# Patient Record
Sex: FEMALE | URBAN - METROPOLITAN AREA
[De-identification: names, ages, dates, MRNs, and addresses within clinical notes are randomized per-mention and may not be internally consistent; named-entity substitution may affect disease eponyms.]

---

## 2024-07-25 ENCOUNTER — APPOINTMENT (RX ONLY)
Dept: URBAN - METROPOLITAN AREA CLINIC 55 | Facility: CLINIC | Age: 44
Setting detail: DERMATOLOGY
End: 2024-07-25

## 2024-07-25 DIAGNOSIS — Z41.9 ENCOUNTER FOR PROCEDURE FOR PURPOSES OTHER THAN REMEDYING HEALTH STATE, UNSPECIFIED: ICD-10-CM

## 2024-07-25 PROCEDURE — ? INVENTORY

## 2024-07-25 PROCEDURE — ? COSMETIC CONSULTATION: GENERAL

## 2024-07-25 PROCEDURE — ? BOTOX

## 2024-07-25 NOTE — PROCEDURE: BOTOX
Additional Area 6 Units: 0
Forehead Units: 8
Detail Level: Detailed
Show Orbicularis Oculi Units: Yes
Show Inventory Tab: Show
Show Right And Left Pupillary Line Units: No
Glabellar Complex Units: 16
Depressor Anguli Oris Units: 6
Additional Area 2 Location: chin
Consent: Verbal consent obtained. Risks include but not limited to lid/brow ptosis, bruising, swelling, diplopia, temporary effect, incomplete chemical denervation.
Periorbital Skin Units: 12
Additional Area 1 Location: upper lip
Dilution (U/0.1 Cc): 4
Post-Care Instructions: Patient instructed to not lie down for 4 hours and limit physical activity for 24 hours.
Incrementing Botox Units: By 0.5 Units
Additional Area 3 Location: lower lip

## 2024-10-15 ENCOUNTER — APPOINTMENT (RX ONLY)
Dept: URBAN - METROPOLITAN AREA CLINIC 55 | Facility: CLINIC | Age: 44
Setting detail: DERMATOLOGY
End: 2024-10-15

## 2024-10-15 DIAGNOSIS — Z41.9 ENCOUNTER FOR PROCEDURE FOR PURPOSES OTHER THAN REMEDYING HEALTH STATE, UNSPECIFIED: ICD-10-CM

## 2024-10-15 PROCEDURE — ? FILLERS

## 2024-10-15 PROCEDURE — ? INVENTORY

## 2024-10-15 PROCEDURE — ? COSMETIC CONSULTATION: GENERAL

## 2024-10-15 NOTE — PROCEDURE: FILLERS
Nasolabial Folds Filler Volume In Cc: 0
Anesthesia Type: 1% lidocaine with epinephrine
Anesthesia Volume In Cc: 0.5
Additional Anesthesia Volume In Cc: 6
Include Cannula Information In Note?: No
Detail Level: Detailed
Include Cannula Information In Note?: Yes
Consent: Verbal consent obtained, risks reviewed.
Post-Care Instructions: After the procedure, patient instructed to apply ice to reduce swelling.
Include Cannula Size?: 25G
Include Cannula Size?: 27G
Filler: RHA 3
Map Statment: See Attach Map for Complete Details

## 2024-10-30 ENCOUNTER — APPOINTMENT (RX ONLY)
Dept: URBAN - METROPOLITAN AREA CLINIC 55 | Facility: CLINIC | Age: 44
Setting detail: DERMATOLOGY
End: 2024-10-30

## 2024-10-30 DIAGNOSIS — Z41.9 ENCOUNTER FOR PROCEDURE FOR PURPOSES OTHER THAN REMEDYING HEALTH STATE, UNSPECIFIED: ICD-10-CM

## 2024-10-30 PROCEDURE — ? FILLERS

## 2024-10-30 PROCEDURE — ? INVENTORY

## 2024-10-30 NOTE — PROCEDURE: FILLERS
Dorsal Hands Filler Volume In Cc: 0
Post-Care Instructions: After the procedure, patient instructed to apply ice to reduce swelling.
Include Cannula Information In Note?: No
Filler: RHA Redensity
Map Statment: See Attach Map for Complete Details
Include Cannula Size?: 27G
Anesthesia Type: 1% lidocaine with epinephrine
Include Cannula Information In Note?: Yes
Anesthesia Volume In Cc: 0.5
Additional Anesthesia Volume In Cc: 6
Include Cannula Size?: 25G
Detail Level: Detailed
Consent: Verbal consent obtained, risks reviewed.

## 2025-01-15 ENCOUNTER — APPOINTMENT (OUTPATIENT)
Dept: URBAN - METROPOLITAN AREA CLINIC 55 | Facility: CLINIC | Age: 45
Setting detail: DERMATOLOGY
End: 2025-01-15

## 2025-01-15 DIAGNOSIS — Z41.9 ENCOUNTER FOR PROCEDURE FOR PURPOSES OTHER THAN REMEDYING HEALTH STATE, UNSPECIFIED: ICD-10-CM

## 2025-01-15 PROCEDURE — ? COSMETIC CONSULTATION: CLEAR AND BRILLIANT

## 2025-01-15 PROCEDURE — ? INVENTORY

## 2025-01-15 PROCEDURE — ? COSMETIC CONSULTATION: GENERAL

## 2025-01-15 PROCEDURE — ? BOTOX

## 2025-01-15 NOTE — PROCEDURE: BOTOX
Inferior Lateral Orbicularis Oculi Units: 0
Show Additional Area 4: Yes
Detail Level: Detailed
Show Right And Left Pupillary Line Units: No
Incrementing Botox Units: By 0.5 Units
Periorbital Skin Units: 12
Additional Area 1 Location: jawline
Show Inventory Tab: Show
Forehead Units: 8
Consent obtained and risk reviewed.
Additional Area 1 Units: 20
Dilution (U/0.1 Cc): 4
Post-Care Instructions: Discussed not to lie down flat  or vigorously rub the treatment area for the next 4 hours .
Glabellar Complex Units: 16
Comments: Make up was removed from treatment area and then prepped with 70% isopropyl alcohol prior to injections.
Depressor Anguli Oris Units: 6

## 2025-04-30 ENCOUNTER — APPOINTMENT (OUTPATIENT)
Dept: URBAN - METROPOLITAN AREA CLINIC 55 | Facility: CLINIC | Age: 45
Setting detail: DERMATOLOGY
End: 2025-04-30

## 2025-04-30 DIAGNOSIS — Z41.9 ENCOUNTER FOR PROCEDURE FOR PURPOSES OTHER THAN REMEDYING HEALTH STATE, UNSPECIFIED: ICD-10-CM

## 2025-04-30 PROCEDURE — ? BOTOX

## 2025-04-30 PROCEDURE — ? INVENTORY

## 2025-04-30 NOTE — PROCEDURE: BOTOX
R Brow Units: 0
Show Periorbital Units: Yes
Detail Level: Detailed
Depressor Anguli Oris Units: 6
Additional Area 3 Location: gummy smile
Show Right And Left Periorbital Units: No
Dilution (U/0.1 Cc): 4
Periorbital Skin Units: 12
Additional Area 2 Location: Nasalis
Incrementing Botox Units: By 0.5 Units
Consent obtained. Risks include but not limited to lid/brow ptosis, bruising, swelling, diplopia, temporary effect, incomplete chemical denervation.
Additional Area 1 Location: Lip
Show Inventory Tab: Hide
Forehead Units: 8
Additional Area 4 Location: Chin
Post-Care Instructions: Patient instructed to not lie down for 4 hours and limit physical activity for 24 hours.
Glabellar Complex Units: 16

## 2025-07-25 ENCOUNTER — APPOINTMENT (OUTPATIENT)
Dept: URBAN - METROPOLITAN AREA CLINIC 55 | Facility: CLINIC | Age: 45
Setting detail: DERMATOLOGY
End: 2025-07-25

## 2025-07-25 ENCOUNTER — RX ONLY (RX ONLY)
Age: 45
End: 2025-07-25

## 2025-07-25 DIAGNOSIS — Z41.9 ENCOUNTER FOR PROCEDURE FOR PURPOSES OTHER THAN REMEDYING HEALTH STATE, UNSPECIFIED: ICD-10-CM

## 2025-07-25 PROCEDURE — ? COSMETIC CONSULTATION: GENERAL

## 2025-07-25 PROCEDURE — ? SCULPTRA

## 2025-07-25 RX ORDER — IVERMECTIN/METRONIDAZOLE/NIACI 1 %-1 %-4%
GEL (GRAM) TOPICAL
Qty: 30 | Refills: 0 | Status: ERX | COMMUNITY
Start: 2025-07-25

## 2025-07-25 NOTE — PROCEDURE: SCULPTRA
Show Decollete Volume?: Yes
Left Cheek Filler Volume In Cc: 0
Show Right And Left [facility]mc Volume?: No
Dilution Method: Sculptra was reconstituted with 8mL of sterile water and 2cc of 2% plain lidocaine for a total volume of 10ccs for each vial.
Injection Technique: The Sculptra was injected with a 25g cannula to the listed areas after removing makeup, and cleansed with 70% isopropyl alcohol.
Anesthesia Volume In Cc: 0.2
Consent obtained and risk reviewed.
Detail Level: Detailed
Additional Anesthesia Volume In Cc: 6
Show Inventory Tab: Show
Post-Care Instructions: Patient instructed to apply ice to reduce swelling and reviewed post procedural massage.
Volumizer: Sculptra
Map Statement: See Attached Map for Complete Details.
Vials Reconstituted (Required For Inventory): 2
Anesthesia Type: 1% lidocaine with epinephrine

## 2025-08-20 ENCOUNTER — APPOINTMENT (OUTPATIENT)
Dept: URBAN - METROPOLITAN AREA CLINIC 55 | Facility: CLINIC | Age: 45
Setting detail: DERMATOLOGY
End: 2025-08-20

## 2025-08-20 DIAGNOSIS — Z41.9 ENCOUNTER FOR PROCEDURE FOR PURPOSES OTHER THAN REMEDYING HEALTH STATE, UNSPECIFIED: ICD-10-CM

## 2025-08-20 PROCEDURE — ? FILLERS

## 2025-08-20 PROCEDURE — ? INVENTORY

## 2025-08-26 ENCOUNTER — APPOINTMENT (OUTPATIENT)
Dept: URBAN - METROPOLITAN AREA CLINIC 55 | Facility: CLINIC | Age: 45
Setting detail: DERMATOLOGY
End: 2025-08-26

## 2025-08-26 DIAGNOSIS — Z41.9 ENCOUNTER FOR PROCEDURE FOR PURPOSES OTHER THAN REMEDYING HEALTH STATE, UNSPECIFIED: ICD-10-CM

## 2025-08-26 PROCEDURE — ? BOTOX

## 2025-08-26 PROCEDURE — ? COSMETIC FOLLOW-UP

## 2025-09-02 ENCOUNTER — APPOINTMENT (OUTPATIENT)
Dept: URBAN - METROPOLITAN AREA CLINIC 55 | Facility: CLINIC | Age: 45
Setting detail: DERMATOLOGY
End: 2025-09-02

## 2025-09-02 DIAGNOSIS — Z41.9 ENCOUNTER FOR PROCEDURE FOR PURPOSES OTHER THAN REMEDYING HEALTH STATE, UNSPECIFIED: ICD-10-CM

## 2025-09-02 PROCEDURE — ? FILLERS

## 2025-09-02 PROCEDURE — ? COSMETIC FOLLOW-UP

## 2025-09-02 PROCEDURE — ? INVENTORY
